# Patient Record
Sex: FEMALE | Race: WHITE | Employment: OTHER | URBAN - METROPOLITAN AREA
[De-identification: names, ages, dates, MRNs, and addresses within clinical notes are randomized per-mention and may not be internally consistent; named-entity substitution may affect disease eponyms.]

---

## 2018-10-16 ENCOUNTER — APPOINTMENT (OUTPATIENT)
Dept: CT IMAGING | Age: 83
DRG: 690 | End: 2018-10-16
Attending: EMERGENCY MEDICINE
Payer: MEDICARE

## 2018-10-16 ENCOUNTER — APPOINTMENT (OUTPATIENT)
Dept: GENERAL RADIOLOGY | Age: 83
DRG: 690 | End: 2018-10-16
Attending: EMERGENCY MEDICINE
Payer: MEDICARE

## 2018-10-16 ENCOUNTER — HOSPITAL ENCOUNTER (INPATIENT)
Age: 83
LOS: 3 days | Discharge: NURSING, REHAB OR LONG-TERM CARE | DRG: 690 | End: 2018-10-20
Attending: EMERGENCY MEDICINE | Admitting: INTERNAL MEDICINE
Payer: MEDICARE

## 2018-10-16 DIAGNOSIS — G93.40 ACUTE ENCEPHALOPATHY: Primary | ICD-10-CM

## 2018-10-16 DIAGNOSIS — N39.0 URINARY TRACT INFECTION WITHOUT HEMATURIA, SITE UNSPECIFIED: ICD-10-CM

## 2018-10-16 LAB
ALBUMIN SERPL-MCNC: 3.3 G/DL (ref 3.5–5)
ALBUMIN/GLOB SERPL: 0.9 {RATIO} (ref 1.1–2.2)
ALP SERPL-CCNC: 63 U/L (ref 45–117)
ALT SERPL-CCNC: 14 U/L (ref 12–78)
ANION GAP SERPL CALC-SCNC: 6 MMOL/L (ref 5–15)
APPEARANCE UR: ABNORMAL
AST SERPL-CCNC: 17 U/L (ref 15–37)
BACTERIA URNS QL MICRO: ABNORMAL /HPF
BASOPHILS # BLD: 0 K/UL (ref 0–0.1)
BASOPHILS NFR BLD: 0 % (ref 0–1)
BILIRUB SERPL-MCNC: 0.9 MG/DL (ref 0.2–1)
BILIRUB UR QL CFM: NEGATIVE
BUN SERPL-MCNC: 26 MG/DL (ref 6–20)
BUN/CREAT SERPL: 23 (ref 12–20)
CALCIUM SERPL-MCNC: 8.9 MG/DL (ref 8.5–10.1)
CHLORIDE SERPL-SCNC: 107 MMOL/L (ref 97–108)
CK MB CFR SERPL CALC: NORMAL % (ref 0–2.5)
CK MB SERPL-MCNC: <1 NG/ML (ref 5–25)
CK SERPL-CCNC: 40 U/L (ref 26–192)
CO2 SERPL-SCNC: 28 MMOL/L (ref 21–32)
COLOR UR: ABNORMAL
CREAT SERPL-MCNC: 1.15 MG/DL (ref 0.55–1.02)
DIFFERENTIAL METHOD BLD: NORMAL
EOSINOPHIL # BLD: 0.1 K/UL (ref 0–0.4)
EOSINOPHIL NFR BLD: 2 % (ref 0–7)
EPITH CASTS URNS QL MICRO: ABNORMAL /LPF
ERYTHROCYTE [DISTWIDTH] IN BLOOD BY AUTOMATED COUNT: 13.3 % (ref 11.5–14.5)
GLOBULIN SER CALC-MCNC: 3.7 G/DL (ref 2–4)
GLUCOSE SERPL-MCNC: 95 MG/DL (ref 65–100)
GLUCOSE UR STRIP.AUTO-MCNC: NEGATIVE MG/DL
HCT VFR BLD AUTO: 40.1 % (ref 35–47)
HGB BLD-MCNC: 13.5 G/DL (ref 11.5–16)
HGB UR QL STRIP: NEGATIVE
IMM GRANULOCYTES # BLD: 0 K/UL (ref 0–0.04)
IMM GRANULOCYTES NFR BLD AUTO: 0 % (ref 0–0.5)
KETONES UR QL STRIP.AUTO: ABNORMAL MG/DL
LACTATE SERPL-SCNC: 1.1 MMOL/L (ref 0.4–2)
LEUKOCYTE ESTERASE UR QL STRIP.AUTO: ABNORMAL
LYMPHOCYTES # BLD: 1.6 K/UL (ref 0.8–3.5)
LYMPHOCYTES NFR BLD: 28 % (ref 12–49)
MAGNESIUM SERPL-MCNC: 2.1 MG/DL (ref 1.6–2.4)
MCH RBC QN AUTO: 31 PG (ref 26–34)
MCHC RBC AUTO-ENTMCNC: 33.7 G/DL (ref 30–36.5)
MCV RBC AUTO: 92.2 FL (ref 80–99)
MONOCYTES # BLD: 0.6 K/UL (ref 0–1)
MONOCYTES NFR BLD: 10 % (ref 5–13)
NEUTS SEG # BLD: 3.5 K/UL (ref 1.8–8)
NEUTS SEG NFR BLD: 60 % (ref 32–75)
NITRITE UR QL STRIP.AUTO: NEGATIVE
NRBC # BLD: 0 K/UL (ref 0–0.01)
NRBC BLD-RTO: 0 PER 100 WBC
PH UR STRIP: 5.5 [PH] (ref 5–8)
PLATELET # BLD AUTO: 150 K/UL (ref 150–400)
PMV BLD AUTO: 9.3 FL (ref 8.9–12.9)
POTASSIUM SERPL-SCNC: 3.8 MMOL/L (ref 3.5–5.1)
PROT SERPL-MCNC: 7 G/DL (ref 6.4–8.2)
PROT UR STRIP-MCNC: ABNORMAL MG/DL
RBC # BLD AUTO: 4.35 M/UL (ref 3.8–5.2)
RBC #/AREA URNS HPF: ABNORMAL /HPF (ref 0–5)
SODIUM SERPL-SCNC: 141 MMOL/L (ref 136–145)
SP GR UR REFRACTOMETRY: 1.03 (ref 1–1.03)
TROPONIN I SERPL-MCNC: <0.05 NG/ML
UA: UC IF INDICATED,UAUC: ABNORMAL
UROBILINOGEN UR QL STRIP.AUTO: 1 EU/DL (ref 0.2–1)
WBC # BLD AUTO: 5.8 K/UL (ref 3.6–11)
WBC URNS QL MICRO: ABNORMAL /HPF (ref 0–4)

## 2018-10-16 PROCEDURE — 83605 ASSAY OF LACTIC ACID: CPT | Performed by: EMERGENCY MEDICINE

## 2018-10-16 PROCEDURE — 71045 X-RAY EXAM CHEST 1 VIEW: CPT

## 2018-10-16 PROCEDURE — 74011250636 HC RX REV CODE- 250/636: Performed by: INTERNAL MEDICINE

## 2018-10-16 PROCEDURE — 36415 COLL VENOUS BLD VENIPUNCTURE: CPT | Performed by: EMERGENCY MEDICINE

## 2018-10-16 PROCEDURE — 84484 ASSAY OF TROPONIN QUANT: CPT | Performed by: EMERGENCY MEDICINE

## 2018-10-16 PROCEDURE — 93005 ELECTROCARDIOGRAM TRACING: CPT

## 2018-10-16 PROCEDURE — 83735 ASSAY OF MAGNESIUM: CPT | Performed by: EMERGENCY MEDICINE

## 2018-10-16 PROCEDURE — 87077 CULTURE AEROBIC IDENTIFY: CPT | Performed by: EMERGENCY MEDICINE

## 2018-10-16 PROCEDURE — 80053 COMPREHEN METABOLIC PANEL: CPT | Performed by: EMERGENCY MEDICINE

## 2018-10-16 PROCEDURE — 74011250636 HC RX REV CODE- 250/636: Performed by: EMERGENCY MEDICINE

## 2018-10-16 PROCEDURE — 96365 THER/PROPH/DIAG IV INF INIT: CPT

## 2018-10-16 PROCEDURE — 82550 ASSAY OF CK (CPK): CPT | Performed by: EMERGENCY MEDICINE

## 2018-10-16 PROCEDURE — 96361 HYDRATE IV INFUSION ADD-ON: CPT

## 2018-10-16 PROCEDURE — 99284 EMERGENCY DEPT VISIT MOD MDM: CPT

## 2018-10-16 PROCEDURE — 81001 URINALYSIS AUTO W/SCOPE: CPT | Performed by: EMERGENCY MEDICINE

## 2018-10-16 PROCEDURE — 74011250637 HC RX REV CODE- 250/637: Performed by: INTERNAL MEDICINE

## 2018-10-16 PROCEDURE — 87086 URINE CULTURE/COLONY COUNT: CPT | Performed by: EMERGENCY MEDICINE

## 2018-10-16 PROCEDURE — 85025 COMPLETE CBC W/AUTO DIFF WBC: CPT | Performed by: EMERGENCY MEDICINE

## 2018-10-16 PROCEDURE — 74011000258 HC RX REV CODE- 258: Performed by: EMERGENCY MEDICINE

## 2018-10-16 PROCEDURE — 70450 CT HEAD/BRAIN W/O DYE: CPT

## 2018-10-16 RX ORDER — ACETAMINOPHEN 325 MG/1
650 TABLET ORAL
Status: DISCONTINUED | OUTPATIENT
Start: 2018-10-16 | End: 2018-10-20 | Stop reason: HOSPADM

## 2018-10-16 RX ORDER — SODIUM CHLORIDE 9 MG/ML
75 INJECTION, SOLUTION INTRAVENOUS CONTINUOUS
Status: DISCONTINUED | OUTPATIENT
Start: 2018-10-16 | End: 2018-10-18

## 2018-10-16 RX ORDER — ONDANSETRON 2 MG/ML
4 INJECTION INTRAMUSCULAR; INTRAVENOUS
Status: DISCONTINUED | OUTPATIENT
Start: 2018-10-16 | End: 2018-10-20 | Stop reason: HOSPADM

## 2018-10-16 RX ORDER — FLUOXETINE HYDROCHLORIDE 20 MG/1
20 CAPSULE ORAL DAILY
COMMUNITY

## 2018-10-16 RX ORDER — BUSPIRONE HYDROCHLORIDE 5 MG/1
5 TABLET ORAL
COMMUNITY

## 2018-10-16 RX ORDER — COLCHICINE 0.6 MG/1
1.2 TABLET ORAL ONCE
Status: COMPLETED | OUTPATIENT
Start: 2018-10-16 | End: 2018-10-16

## 2018-10-16 RX ORDER — LANOLIN ALCOHOL/MO/W.PET/CERES
3 CREAM (GRAM) TOPICAL
Status: DISCONTINUED | OUTPATIENT
Start: 2018-10-16 | End: 2018-10-20 | Stop reason: HOSPADM

## 2018-10-16 RX ORDER — ENOXAPARIN SODIUM 100 MG/ML
30 INJECTION SUBCUTANEOUS EVERY 24 HOURS
Status: DISCONTINUED | OUTPATIENT
Start: 2018-10-17 | End: 2018-10-20 | Stop reason: HOSPADM

## 2018-10-16 RX ADMIN — CEFTRIAXONE 1 G: 1 INJECTION, POWDER, FOR SOLUTION INTRAMUSCULAR; INTRAVENOUS at 22:06

## 2018-10-16 RX ADMIN — COLCHICINE 1.2 MG: 0.6 TABLET, FILM COATED ORAL at 23:58

## 2018-10-16 RX ADMIN — SODIUM CHLORIDE 75 ML/HR: 900 INJECTION, SOLUTION INTRAVENOUS at 23:58

## 2018-10-16 RX ADMIN — SODIUM CHLORIDE 1000 ML: 900 INJECTION, SOLUTION INTRAVENOUS at 20:37

## 2018-10-16 NOTE — ED PROVIDER NOTES
EMERGENCY DEPARTMENT HISTORY AND PHYSICAL EXAM 
 
 
Date: 10/16/2018 Patient Name: Julien Urrutia History of Presenting Illness No chief complaint on file. History Provided By: Patient's neice HPI: Julien Urrutia, 80 y.o. female with PMHx significant for HTN, thyroid disease, and hypercholesterolemia, presents via EMS to the ED for further evaluation of AMS and decreased activity s/p 2 falls last week. Pt's relative reports associated sx of a decreased appetite and left foot swelling and erythema noticed this morning. Family expresses the pt sustained 2 falls last week one resulting in head trauma noting the pt has been extremely lethargic since. She states the pt lives at home with her brother and her care is provided by caregivers who live with the pt. Family discloses the pt is normally ambulatory with a walker, oriented to self, and has urge incontinence however, over the last week the pt has been asleep, in bed, and totally incontinent. This morning the caregivers noticed the pt's left foot was swollen and erythematous which exacerbated as the day progressed leading family to call EMS. History is limited due to change in mental status. There are no other complaints, changes, or physical findings at this time. PCP: Dimitri Shi MD 
 
Current Outpatient Prescriptions Medication Sig Dispense Refill  CHOLECALCIFEROL, VITAMIN D3, (VITAMIN D3 PO) Take  by mouth daily.  VITAMIN E ACETATE (VITAMIN E PO) Take  by mouth daily. Past History Past Medical History: 
Past Medical History:  
Diagnosis Date  Endocrine disease  Goiter  High cholesterol  Hypertension Past Surgical History: 
Past Surgical History:  
Procedure Laterality Date  BREAST SURGERY PROCEDURE UNLISTED  HX APPENDECTOMY  HX GI    
 HX GYN    
 hysterectomy  HX ORTHOPAEDIC    
 back fusion  HX THYROIDECTOMY partial - 50 years ago  HX TONSILLECTOMY Family History: 
Family History Problem Relation Age of Onset  Heart Disease Brother  Stroke Brother Social History: 
Social History Substance Use Topics  Smoking status: Never Smoker  Smokeless tobacco: Not on file  Alcohol use No  
 
 
Allergies: Allergies Allergen Reactions  Pcn [Penicillins] Itching Review of Systems Review of Systems Unable to perform ROS: Mental status change Physical Exam  
Physical Exam  
Constitutional: She appears well-developed and well-nourished. No distress. HENT:  
Head: Normocephalic and atraumatic. Mouth/Throat: Oropharynx is clear and moist.  
Eyes: Conjunctivae and EOM are normal.  
Pupils are 4mm and sluggish Neck: Neck supple. No JVD present. No tracheal deviation present. Cardiovascular: Normal rate, regular rhythm and intact distal pulses. Exam reveals no gallop and no friction rub. No murmur heard. Pulses: 
     Dorsalis pedis pulses are 2+ on the right side, and 2+ on the left side. Pulmonary/Chest: Effort normal and breath sounds normal. No stridor. No respiratory distress. She has no wheezes. 2L nasal cannula Abdominal: Soft. Bowel sounds are normal. She exhibits no distension and no mass. There is no tenderness. There is no rebound and no guarding. Musculoskeletal: Normal range of motion. She exhibits no edema. No deformity Swelling of the left foot proximal to the left ankle with erythema and warmth to palpation from the left 1st metatarsal joint to the medial foot 3+ pitting pedal edema Neurological: She has normal strength. No focal deficits Opens eyes to verbal and physical stimuli Pt is not following commands Skin: Skin is warm, dry and intact. No rash noted. Nursing note and vitals reviewed. Diagnostic Study Results Labs - Recent Results (from the past 12 hour(s)) METABOLIC PANEL, COMPREHENSIVE Collection Time: 10/16/18  8:17 PM  
Result Value Ref Range Sodium 141 136 - 145 mmol/L Potassium 3.8 3.5 - 5.1 mmol/L Chloride 107 97 - 108 mmol/L  
 CO2 28 21 - 32 mmol/L Anion gap 6 5 - 15 mmol/L Glucose 95 65 - 100 mg/dL BUN 26 (H) 6 - 20 MG/DL Creatinine 1.15 (H) 0.55 - 1.02 MG/DL  
 BUN/Creatinine ratio 23 (H) 12 - 20 GFR est AA 54 (L) >60 ml/min/1.73m2 GFR est non-AA 45 (L) >60 ml/min/1.73m2 Calcium 8.9 8.5 - 10.1 MG/DL Bilirubin, total 0.9 0.2 - 1.0 MG/DL  
 ALT (SGPT) 14 12 - 78 U/L  
 AST (SGOT) 17 15 - 37 U/L Alk. phosphatase 63 45 - 117 U/L Protein, total 7.0 6.4 - 8.2 g/dL Albumin 3.3 (L) 3.5 - 5.0 g/dL Globulin 3.7 2.0 - 4.0 g/dL A-G Ratio 0.9 (L) 1.1 - 2.2 CK W/ CKMB & INDEX Collection Time: 10/16/18  8:17 PM  
Result Value Ref Range CK 40 26 - 192 U/L  
 CK - MB <1.0 <3.6 NG/ML  
 CK-MB Index Cannot be calculated 0 - 2.5    
CBC WITH AUTOMATED DIFF Collection Time: 10/16/18  8:17 PM  
Result Value Ref Range WBC 5.8 3.6 - 11.0 K/uL  
 RBC 4.35 3.80 - 5.20 M/uL  
 HGB 13.5 11.5 - 16.0 g/dL HCT 40.1 35.0 - 47.0 % MCV 92.2 80.0 - 99.0 FL  
 MCH 31.0 26.0 - 34.0 PG  
 MCHC 33.7 30.0 - 36.5 g/dL  
 RDW 13.3 11.5 - 14.5 % PLATELET 329 660 - 039 K/uL MPV 9.3 8.9 - 12.9 FL  
 NRBC 0.0 0  WBC ABSOLUTE NRBC 0.00 0.00 - 0.01 K/uL NEUTROPHILS 60 32 - 75 % LYMPHOCYTES 28 12 - 49 % MONOCYTES 10 5 - 13 % EOSINOPHILS 2 0 - 7 % BASOPHILS 0 0 - 1 % IMMATURE GRANULOCYTES 0 0.0 - 0.5 % ABS. NEUTROPHILS 3.5 1.8 - 8.0 K/UL  
 ABS. LYMPHOCYTES 1.6 0.8 - 3.5 K/UL  
 ABS. MONOCYTES 0.6 0.0 - 1.0 K/UL  
 ABS. EOSINOPHILS 0.1 0.0 - 0.4 K/UL  
 ABS. BASOPHILS 0.0 0.0 - 0.1 K/UL  
 ABS. IMM. GRANS. 0.0 0.00 - 0.04 K/UL  
 DF AUTOMATED    
TROPONIN I Collection Time: 10/16/18  8:17 PM  
Result Value Ref Range Troponin-I, Qt. <0.05 <0.05 ng/mL LACTIC ACID Collection Time: 10/16/18  8:17 PM  
Result Value Ref Range  Lactic acid 1.1 0.4 - 2.0 MMOL/L  
MAGNESIUM  
 Collection Time: 10/16/18  8:17 PM  
Result Value Ref Range Magnesium 2.1 1.6 - 2.4 mg/dL URINALYSIS W/ REFLEX CULTURE Collection Time: 10/16/18  9:09 PM  
Result Value Ref Range Color DARK YELLOW Appearance CLOUDY (A) CLEAR Specific gravity 1.030 1.003 - 1.030    
 pH (UA) 5.5 5.0 - 8.0 Protein TRACE (A) NEG mg/dL Glucose NEGATIVE  NEG mg/dL Ketone TRACE (A) NEG mg/dL Blood NEGATIVE  NEG Urobilinogen 1.0 0.2 - 1.0 EU/dL Nitrites NEGATIVE  NEG Leukocyte Esterase SMALL (A) NEG    
 WBC 10-20 0 - 4 /hpf  
 RBC 0-5 0 - 5 /hpf Epithelial cells FEW FEW /lpf Bacteria 4+ (A) NEG /hpf  
 UA:UC IF INDICATED URINE CULTURE ORDERED (A) CNI    
BILIRUBIN, CONFIRM Collection Time: 10/16/18  9:09 PM  
Result Value Ref Range Bilirubin UA, confirm NEGATIVE  NEG Radiologic Studies -  
CT Results  (Last 48 hours) 10/16/18 2000  CT HEAD WO CONT Final result Impression:  IMPRESSION:  No acute findings. White matter hypodensity consistent with chronic  
small vessel ischemic change. Narrative:  EXAMINATION:  CT HEAD WO CONT  
   
CLINICAL INFORMATION:  Confusion/delirium, altered LOC, unexplained COMPARISON:  3/9/2013 TECHNIQUE: Routine axial head CT was performed. IV contrast was not  
administered. Sagittal and coronal reconstructions were generated. CT dose reduction was achieved through use of a standardized protocol tailored  
for this examination and automatic exposure control for dose modulation. FINDINGS:  
No acute infarct, hemorrhage or mass. VENTRICULAR SYSTEM:  Normal for age. BASAL CISTERNS:  Patent. BRAIN PARENCHYMA:  Hypodensity of the cerebral white matter, likely due to  
intracranial small vessel disease. Progression since 2013. MIDLINE SHIFT:  None. CALVARIUM/ SKULL BASE: Intact. Bilateral TMJ arthropathy. PARANASAL SINUSES AND MASTOID AIR CELLS: Clear. VISUALIZED ORBITS: No significant abnormalities. SELLA: No enlargement. CXR Results  (Last 48 hours) 10/16/18 2010  XR CHEST PORT Final result Impression:  IMPRESSION:   
   
No acute process. Narrative:  EXAM:  XR CHEST PORT INDICATION:  Chest Pain COMPARISON:  6/27/2016 FINDINGS:  
   
A portable AP radiograph of the chest was obtained at 20:03 hours. The lungs are  
clear. There is mild cardiomegaly but no vascular congestion or pleural fluid. The bones and soft tissues are unremarkable. There has been no change since the  
prior study. There is tracheal displacement to the left which is likely due to  
thyroid enlargement and which is unchanged since 2013. Medical Decision Making I am the first provider for this patient. I reviewed the vital signs, available nursing notes, past medical history, past surgical history, family history and social history. Vital Signs-Reviewed the patient's vital signs. Patient Vitals for the past 12 hrs: 
 Temp Pulse Resp BP SpO2  
10/16/18 2100 - - - 148/89 (!) 89 % 10/16/18 2008 - - - 138/79 -  
10/16/18 1912 98.2 °F (36.8 °C) 64 16 123/68 99 % Pulse Oximetry Analysis - 99% on 2L nasal cannula Cardiac Monitor:  
Rate: 64 bpm 
Rhythm: Normal Sinus Rhythm EKG interpretation: (Preliminary) 1908 Rhythm: NSR with RBBB; and regular . Rate (approx.): 68; Axis: left axis deviation; DC interval: normal; QRS interval: widened; ST/T wave: non-specific changes; Other findings: non-ischemic. Records Reviewed: Nursing Notes, Old Medical Records, Previous electrocardiograms, Ambulance Run Sheet, Previous Radiology Studies and Previous Laboratory Studies Provider Notes (Medical Decision Making): DDx: uti, pneumonia, dehydration, karime, electrolyte abnormality, worsening dementia, ICH 
 
ED Course:  
Initial assessment performed.  The patients presenting problems have been discussed, and they are in agreement with the care plan formulated and outlined with them. I have encouraged them to ask questions as they arise throughout their visit. Progress Notes: 
 
CONSULT NOTE:  
10:00 PM 
Kimberly Castillo DO spoke with Dr. Yolanda Nguyen, Specialty: Hospitalist 
Discussed pt's hx, disposition, and available diagnostic and imaging results. Reviewed care plans. Consultant will evaluate pt for admission. Written by Lexy Parry ED Scribe, as dictated by Kimberly Castillo DO. Critical Care Time: 0 minutes Disposition: 
Admit Note: 
10:00 PM 
Patient is being admitted to the hospital by Dr. Yolanda Nguyen. The results of their tests and reasons for their admission have been discussed with the patient and/or available family. They convey their agreement and understanding for the need to be admitted and for their admission diagnosis. Written by Sangeetha Albrecht ED Scribe, as dictated by Kimberly Castillo DO. PLAN: 
1. Admission Diagnosis Clinical Impression: 1. Acute encephalopathy 2. Urinary tract infection without hematuria, site unspecified Attestations: 
 
Attestation: This note is prepared by Katie Sanches. Ambrocio, acting as Scribe for Kimberly Castillo DO. Kimberly Castillo DO: The scribe's documentation has been prepared under my direction and personally reviewed by me in its entirety. I confirm that the note above accurately reflects all work, treatment, procedures, and medical decision making performed by me.

## 2018-10-17 ENCOUNTER — APPOINTMENT (OUTPATIENT)
Dept: GENERAL RADIOLOGY | Age: 83
DRG: 690 | End: 2018-10-17
Attending: INTERNAL MEDICINE
Payer: MEDICARE

## 2018-10-17 ENCOUNTER — APPOINTMENT (OUTPATIENT)
Dept: ULTRASOUND IMAGING | Age: 83
DRG: 690 | End: 2018-10-17
Attending: EMERGENCY MEDICINE
Payer: MEDICARE

## 2018-10-17 LAB
ATRIAL RATE: 68 BPM
CALCULATED P AXIS, ECG09: 67 DEGREES
CALCULATED R AXIS, ECG10: -84 DEGREES
CALCULATED T AXIS, ECG11: 3 DEGREES
DIAGNOSIS, 93000: NORMAL
P-R INTERVAL, ECG05: 206 MS
Q-T INTERVAL, ECG07: 478 MS
QRS DURATION, ECG06: 144 MS
QTC CALCULATION (BEZET), ECG08: 508 MS
VENTRICULAR RATE, ECG03: 68 BPM

## 2018-10-17 PROCEDURE — 77010033678 HC OXYGEN DAILY

## 2018-10-17 PROCEDURE — 73620 X-RAY EXAM OF FOOT: CPT

## 2018-10-17 PROCEDURE — G8988 SELF CARE GOAL STATUS: HCPCS | Performed by: OCCUPATIONAL THERAPIST

## 2018-10-17 PROCEDURE — 97161 PT EVAL LOW COMPLEX 20 MIN: CPT | Performed by: PHYSICAL THERAPIST

## 2018-10-17 PROCEDURE — 97530 THERAPEUTIC ACTIVITIES: CPT | Performed by: OCCUPATIONAL THERAPIST

## 2018-10-17 PROCEDURE — 93971 EXTREMITY STUDY: CPT

## 2018-10-17 PROCEDURE — 94760 N-INVAS EAR/PLS OXIMETRY 1: CPT

## 2018-10-17 PROCEDURE — 74011250637 HC RX REV CODE- 250/637: Performed by: INTERNAL MEDICINE

## 2018-10-17 PROCEDURE — G8987 SELF CARE CURRENT STATUS: HCPCS | Performed by: OCCUPATIONAL THERAPIST

## 2018-10-17 PROCEDURE — 74011000258 HC RX REV CODE- 258: Performed by: INTERNAL MEDICINE

## 2018-10-17 PROCEDURE — 97530 THERAPEUTIC ACTIVITIES: CPT | Performed by: PHYSICAL THERAPIST

## 2018-10-17 PROCEDURE — 65270000029 HC RM PRIVATE

## 2018-10-17 PROCEDURE — G8989 SELF CARE D/C STATUS: HCPCS | Performed by: OCCUPATIONAL THERAPIST

## 2018-10-17 PROCEDURE — 97167 OT EVAL HIGH COMPLEX 60 MIN: CPT | Performed by: OCCUPATIONAL THERAPIST

## 2018-10-17 PROCEDURE — 74011250636 HC RX REV CODE- 250/636: Performed by: INTERNAL MEDICINE

## 2018-10-17 RX ORDER — BUSPIRONE HYDROCHLORIDE 10 MG/1
5 TABLET ORAL 2 TIMES DAILY
Status: DISCONTINUED | OUTPATIENT
Start: 2018-10-17 | End: 2018-10-20 | Stop reason: HOSPADM

## 2018-10-17 RX ORDER — CYANOCOBALAMIN (VITAMIN B-12) 500 MCG
400 TABLET ORAL DAILY
Status: DISCONTINUED | OUTPATIENT
Start: 2018-10-17 | End: 2018-10-20 | Stop reason: HOSPADM

## 2018-10-17 RX ORDER — FLUOXETINE HYDROCHLORIDE 20 MG/1
20 CAPSULE ORAL DAILY
Status: DISCONTINUED | OUTPATIENT
Start: 2018-10-17 | End: 2018-10-20 | Stop reason: HOSPADM

## 2018-10-17 RX ADMIN — FLUOXETINE HYDROCHLORIDE 20 MG: 20 CAPSULE ORAL at 09:31

## 2018-10-17 RX ADMIN — ACETAMINOPHEN 650 MG: 325 TABLET ORAL at 05:14

## 2018-10-17 RX ADMIN — BUSPIRONE HYDROCHLORIDE 5 MG: 10 TABLET ORAL at 18:25

## 2018-10-17 RX ADMIN — ENOXAPARIN SODIUM 30 MG: 30 INJECTION, SOLUTION INTRAVENOUS; SUBCUTANEOUS at 09:31

## 2018-10-17 RX ADMIN — BUSPIRONE HYDROCHLORIDE 5 MG: 10 TABLET ORAL at 09:31

## 2018-10-17 RX ADMIN — CEFTRIAXONE 1 G: 1 INJECTION, POWDER, FOR SOLUTION INTRAMUSCULAR; INTRAVENOUS at 18:25

## 2018-10-17 NOTE — PROGRESS NOTES
Reason for Admission:   Acute encephalopathy; UTI (urniary tract infection) RRAT Score:    8 LOW Plan for utilizing home health:   Per recommendation Likelihood of Readmission:   High per pt acuity and co morbidities Transition of Care Plan:                  Pt is a 80year old,  female, admitted with Acute encephalopathy; UTI (urniary tract infection). Pt was alert but not oriented. CM contacted pt niece who is the POA and point of contact. Pt niece states pt is living with her brother (elina Arrington) in a one level home with a ramp in the back. Pt has a walker and bedside commode. Pt does not drive and has had HH in the past but the name is unknown. Pt has not been to a SNF in the past. Pt's preferred pharmacy is the [facility] in [facility]. Pt niece states they would like pt to go to SNF and eventually transfer to LTC. Pt niece and family has chosen [facility] for SNF and would like PT/OT to attempt working with pt. At time of discharge pt will need [facility]. CM will continue to follow pt for discharge planning. Care Management Interventions PCP Verified by CM: Yes Mode of Transport at Discharge: BLS Transition of Care Consult (CM Consult): Discharge Planning, SNF MyChart Signup: No 
Discharge Durable Medical Equipment: No 
Health Maintenance Reviewed: Yes Physical Therapy Consult: Yes Occupational Therapy Consult: Yes Speech Therapy Consult: No 
Current Support Network: Lives with Caregiver, [place] Confirm Follow Up Transport: Family Plan discussed with Pt/Family/Caregiver: Yes Freedom of Choice Offered: Yes Discharge Location Discharge Placement: Other:(TBD) AYANA Jade, ISABEL [facility] [phone]

## 2018-10-17 NOTE — PROGRESS NOTES
Hospitalist Progress Note NAME: Gabriela Rosario :  1931 MRN:  [id number] Assessment / Plan: 
Acute encephalopathy Generalized weakness, s/p falls 
recent fall but exam nonfocal and CT head nothing acute. AMS/ weakness likely metabolic and multifactorial from UTI, dehydration. UTI 
continue Rocephin , will wait for cultures to come back. Dehydration Continue the IVF hydration. Acute gout, left foot Pain and swelling better. Given a dose of  colchicine 1.2mg x 1.  , checking Xray Waiting uric acid level , may need to be placed on gout medications. PT OT eval and treat Depression / Anxiety 
continue prozac and buspar Hx Partial Thyroidectomy TSH is low , will wait free t4 and t3 result. Body mass index is 27.28 kg/m².: 25.0 - 29.9 Overweight Code status: DNR Prophylaxis: Lovenox Recommended Disposition: tbd Subjective: Chief Complaint / Reason for Physician Visit \"mild confusion still going on \". Discussed with RN events overnight. Review of Systems: 
Symptom Y/N Comments  Symptom Y/N Comments Fever/Chills    Chest Pain Poor Appetite    Edema Cough    Abdominal Pain Sputum    Joint Pain SOB/JAIN    Pruritis/Rash Nausea/vomit    Tolerating PT/OT Diarrhea    Tolerating Diet Constipation    Other Could NOT obtain due to: AMS Objective: VITALS:  
Last 24hrs VS reviewed since prior progress note. Most recent are: 
Patient Vitals for the past 24 hrs: 
 Temp Pulse Resp BP SpO2  
10/17/18 1219 98.1 °F (36.7 °C) 72 18 126/66 95 % 10/17/18 0928 98.1 °F (36.7 °C) 85 16 115/82 94 % 10/17/18 0430 98.5 °F (36.9 °C) 91 18 (!) 158/91 90 % 10/17/18 0030 98.8 °F (37.1 °C) 79 16 (!) 158/97 96 % 10/17/18 0001 98.4 °F (36.9 °C) 66 18 147/84 95 % 10/16/18 2300    108/65 94 % 10/16/18 2200    137/80 94 % 10/16/18 2100    148/89 (!) 89 % 10/16/18 2008    138/79  10/16/18 1912 98.2 °F (36.8 °C) 64 16 123/68 99 % No intake or output data in the 24 hours ending 10/17/18 1709 PHYSICAL EXAM: 
General: WD, WN. Alert, cooperative, no acute distress   
EENT:  EOMI. Anicteric sclerae. MMM Resp:  CTA bilaterally, no wheezing or rales. No accessory muscle use CV:  Regular  rhythm,  No edema GI:  Soft, Non distended, Non tender.  +Bowel sounds Neurologic:  Mild confusion noted Left foot swollen specially close to the base of 1 toe and ankle area. Reviewed most current lab test results and cultures  YES Reviewed most current radiology test results   YES Review and summation of old records today    NO Reviewed patient's current orders and MAR    YES 
PMH/ reviewed - no change compared to H&P 
________________________________________________________________________ Care Plan discussed with: 
  Comments Patient y Family RN y   
Care Manager Consultant Multidiciplinary team rounds were held today with , nursing, pharmacist and clinical coordinator. Patient's plan of care was discussed; medications were reviewed and discharge planning was addressed. ________________________________________________________________________ Total NON critical care TIME:  30    Minutes Total CRITICAL CARE TIME Spent:   Minutes non procedure based Comments >50% of visit spent in counseling and coordination of care    
________________________________________________________________________ Singh Castelan MD  
 
Procedures: see electronic medical records for all procedures/Xrays and details which were not copied into this note but were reviewed prior to creation of Plan. LABS: 
I reviewed today's most current labs and imaging studies. Pertinent labs include: 
Recent Labs 10/16/18 
2017 WBC 5.8 HGB 13.5 HCT 40.1  Recent Labs 10/16/18 
2017   
K 3.8  CO2 28  
GLU 95 BUN 26* CREA 1.15* CA 8.9 MG 2.1 ALB 3.3* TBILI 0.9 SGOT 17 ALT 14 Signed:  Franci Ortez MD

## 2018-10-17 NOTE — H&P
Hospitalist Admission NoteNAME: Gabriela Rosario :  1931 MRN:  [id number] Date/Time:  10/16/2018 11:29 PM 
 
Patient PCP: Alejandra Ceja MD 
_____________________________________________________________________ Given the patient's current clinical presentation, I have a high level of concern for decompensation if discharged from the emergency department. Complex decision making was performed, which includes reviewing the patient's available past medical records, laboratory results, and x-ray films. My assessment of this patient's clinical condition and my plan of care is as follows. Assessment / Plan: 
 
Acute encephalopathy Generalized weakness, s/p falls 
-recent fall but exam nonfocal and CT head nothing acute. -AMS/ weakness likely metabolic and multifactorial from UTI, dehydration. UTI 
-continue Rocephin started in ER Dehydration 
-pre renal shift in labs. Start IVF hydration Acute gout, left foot 
-fell a week ago but her left foot pain and swelling started yesterday and worse today. -dehydration and recent immobility predisposes to gout. Will check UA in AM and give a dose of colchicine 1.2mg x 1.    
-PT OT eval and treat Depression / Anxiety 
-continue prozac and buspar Hx Partial Thyroidectomy 
-No longer on replacement therapy. Will check TSH Code Status:  DNR Surrogate Decision Maker:   Niece (Roshni Carr) mPOA. [phone] cell DVT Prophylaxis:   lovenox GI Prophylaxis: not indicated Baseline:  Lives with brother. Single. No children. Uses walker Subjective: CHIEF COMPLAINT:   AMS HISTORY OF PRESENT ILLNESS:    
Mihir Strong is a 80 y.o.  female with a history of dementia, thyroid disease and anxiety who presents to the ER via EMS because of AMS. Patient lives with her brother and has 24/7 caregivers. She typically is alert, talkative and ambulates with a walker.   Lately she has been appearing sleepy, less verbal, appearing weaker with poor appetite. She reportedly fell 2-3 weeks ago and may have hit her head. Yesterday, she experienced left foot pain and swelling which worsened to the point that she could not bear weight today. ER evaluation remarkable for UTI and dehydration. We were asked to admit for work up and evaluation of the above problems. Past Medical History:  
Diagnosis Date  Dementia  Endocrine disease  Goiter  High cholesterol  Hypertension Past Surgical History:  
Procedure Laterality Date  BREAST SURGERY PROCEDURE UNLISTED  HX APPENDECTOMY  HX GI    
 HX GYN    
 hysterectomy  HX ORTHOPAEDIC    
 back fusion  HX THYROIDECTOMY partial - 50 years ago  HX TONSILLECTOMY Social History Substance Use Topics  Smoking status: Never Smoker  Smokeless tobacco: Never Used  Alcohol use No  
  
 
Family History Problem Relation Age of Onset  Heart Disease Brother  Stroke Brother Allergies Allergen Reactions  Pcn [Penicillins] Itching Prior to Admission medications Medication Sig Start Date End Date Taking? Authorizing Provider FLUoxetine (PROZAC) 20 mg capsule Take 20 mg by mouth daily. Indications: 1/2 tab   Yes Lara Mccarthy MD  
busPIRone (BUSPAR) 5 mg tablet Take 5 mg by mouth three (3) times daily (with meals). Yes Lara Mccarthy MD  
CHOLECALCIFEROL, VITAMIN D3, (VITAMIN D3 PO) Take  by mouth daily. Yes Lara Mccarthy MD  
VITAMIN E ACETATE (VITAMIN E PO) Take  by mouth daily. Lara Mccarthy MD  
 
 
REVIEW OF SYSTEMS:    
I am not able to complete the review of systems because: The patient is intubated and sedated  
x The patient has altered mental status due to his acute medical problems The patient has baseline aphasia from prior stroke(s)  
x The patient has baseline dementia and is not reliable historian The patient is in acute medical distress and unable to provide information Objective: VITALS:   
Visit Vitals  /89  Pulse 64  Temp 98.2 °F (36.8 °C)  Resp 16  
 Ht 5' 9\" (1.753 m)  Wt 83.8 kg (184 lb 11.9 oz)  SpO2 (!) 89%  BMI 27.28 kg/m2 PHYSICAL EXAM: 
 
 
_______________________________________________________________________ Care Plan discussed with: 
  Comments Patient x Family  x  Brady  
RN Care Manager Consultant:     
_______________________________________________________________________ Expected  Disposition:  
Home with Family HH/PT/OT/RN x  
SNF/LTC x  
[facility]   
________________________________________________________________________ TOTAL TIME:  50  Minutes Critical Care Provided     Minutes non procedure based Comments  
 x Reviewed previous records  
>50% of visit spent in counseling and coordination of care  Discussion with patient and/or family and questions answered Given the patient's current clinical presentation, I have a high level of concern for decompensation if discharged from the ED. Complex decision making was performed which includes reviewing the patient's available past medical records, laboratory results, and Xray films. I have also directly communicated my plan and discussed this case with the involved ED physician.  
 
____________________________________________________________________ Stephan Contreras MD 
 
Procedures: see electronic medical records for all procedures/Xrays and details which were not copied into this note but were reviewed prior to creation of Plan. LAB DATA REVIEWED:   
Recent Results (from the past 24 hour(s)) METABOLIC PANEL, COMPREHENSIVE Collection Time: 10/16/18  8:17 PM  
Result Value Ref Range Sodium 141 136 - 145 mmol/L Potassium 3.8 3.5 - 5.1 mmol/L Chloride 107 97 - 108 mmol/L  
 CO2 28 21 - 32 mmol/L Anion gap 6 5 - 15 mmol/L Glucose 95 65 - 100 mg/dL BUN 26 (H) 6 - 20 MG/DL Creatinine 1.15 (H) 0.55 - 1.02 MG/DL  
 BUN/Creatinine ratio 23 (H) 12 - 20 GFR est AA 54 (L) >60 ml/min/1.73m2 GFR est non-AA 45 (L) >60 ml/min/1.73m2 Calcium 8.9 8.5 - 10.1 MG/DL Bilirubin, total 0.9 0.2 - 1.0 MG/DL  
 ALT (SGPT) 14 12 - 78 U/L  
 AST (SGOT) 17 15 - 37 U/L Alk. phosphatase 63 45 - 117 U/L Protein, total 7.0 6.4 - 8.2 g/dL Albumin 3.3 (L) 3.5 - 5.0 g/dL Globulin 3.7 2.0 - 4.0 g/dL A-G Ratio 0.9 (L) 1.1 - 2.2 CK W/ CKMB & INDEX Collection Time: 10/16/18  8:17 PM  
Result Value Ref Range CK 40 26 - 192 U/L  
 CK - MB <1.0 <3.6 NG/ML  
 CK-MB Index Cannot be calculated 0 - 2.5    
CBC WITH AUTOMATED DIFF Collection Time: 10/16/18  8:17 PM  
Result Value Ref Range WBC 5.8 3.6 - 11.0 K/uL  
 RBC 4.35 3.80 - 5.20 M/uL  
 HGB 13.5 11.5 - 16.0 g/dL HCT 40.1 35.0 - 47.0 % MCV 92.2 80.0 - 99.0 FL  
 MCH 31.0 26.0 - 34.0 PG  
 MCHC 33.7 30.0 - 36.5 g/dL  
 RDW 13.3 11.5 - 14.5 % PLATELET 244 179 - 596 K/uL  MPV 9.3 8.9 - 12.9 FL  
 NRBC 0.0 0  WBC ABSOLUTE NRBC 0.00 0.00 - 0.01 K/uL NEUTROPHILS 60 32 - 75 % LYMPHOCYTES 28 12 - 49 % MONOCYTES 10 5 - 13 % EOSINOPHILS 2 0 - 7 % BASOPHILS 0 0 - 1 % IMMATURE GRANULOCYTES 0 0.0 - 0.5 % ABS. NEUTROPHILS 3.5 1.8 - 8.0 K/UL  
 ABS. LYMPHOCYTES 1.6 0.8 - 3.5 K/UL  
 ABS. MONOCYTES 0.6 0.0 - 1.0 K/UL  
 ABS. EOSINOPHILS 0.1 0.0 - 0.4 K/UL  
 ABS. BASOPHILS 0.0 0.0 - 0.1 K/UL  
 ABS. IMM. GRANS. 0.0 0.00 - 0.04 K/UL  
 DF AUTOMATED    
TROPONIN I Collection Time: 10/16/18  8:17 PM  
Result Value Ref Range Troponin-I, Qt. <0.05 <0.05 ng/mL LACTIC ACID Collection Time: 10/16/18  8:17 PM  
Result Value Ref Range Lactic acid 1.1 0.4 - 2.0 MMOL/L  
MAGNESIUM Collection Time: 10/16/18  8:17 PM  
Result Value Ref Range Magnesium 2.1 1.6 - 2.4 mg/dL URINALYSIS W/ REFLEX CULTURE Collection Time: 10/16/18  9:09 PM  
Result Value Ref Range Color DARK YELLOW Appearance CLOUDY (A) CLEAR Specific gravity 1.030 1.003 - 1.030    
 pH (UA) 5.5 5.0 - 8.0 Protein TRACE (A) NEG mg/dL Glucose NEGATIVE  NEG mg/dL Ketone TRACE (A) NEG mg/dL Blood NEGATIVE  NEG Urobilinogen 1.0 0.2 - 1.0 EU/dL Nitrites NEGATIVE  NEG Leukocyte Esterase SMALL (A) NEG    
 WBC 10-20 0 - 4 /hpf  
 RBC 0-5 0 - 5 /hpf Epithelial cells FEW FEW /lpf Bacteria 4+ (A) NEG /hpf  
 UA:UC IF INDICATED URINE CULTURE ORDERED (A) CNI    
BILIRUBIN, CONFIRM Collection Time: 10/16/18  9:09 PM  
Result Value Ref Range  Bilirubin UA, confirm NEGATIVE  NEG

## 2018-10-17 NOTE — PROGRESS NOTES
Bedside and Verbal shift change report given to Priscila Mckay RN (oncoming nurse) by Cheryle Becerril (offgoing nurse). Report included the following information SBAR, Kardex, Intake/Output and MAR.

## 2018-10-17 NOTE — PROGRESS NOTES
physical Therapy EVALUATION/DISCHARGE Patient: Rojelio Walsh (2 y.o. female) Date: 10/17/2018 Primary Diagnosis: Acute encephalopathy UTI (urinary tract infection) Precautions:   Fall ASSESSMENT : 
Based on the objective data described below, the patient presents with near baseline level function. Patient with advanced dementia and unable to provide any PLOF. A caregiver is present during session and states that patient at baseline is intermittently \"combative\" and will pull hair, bite, kick etc but at other times patient is cooperative. Caregiver states they are always \"right next to her when walking\". Patient currently rather lethargic but will wake to name. Upon attempting to mobilize to EOB patient hitting PT/OT and attempting to kick them. Noted patient IV leaking as it had become detached and attempted to roll patient to put clean pad under her and she continues to kick and hit. Given patient's advanced dementia, behavioral issues, and previous need for 24/7 supervision with all mobility patient is at her baseline and is not cooperative with PT/OT at this time. Recommend return to previous setting at DC vs LTC placement. Skilled physical therapy is not indicated at this time. PLAN : 
[facility] [address] [facility] None Further Equipment Recommendations for Discharge: none SUBJECTIVE:  
Patient stated Don't make me go to school!  OBJECTIVE DATA SUMMARY:  
HISTORY:   
Past Medical History:  
Diagnosis Date  Dementia  Endocrine disease  Goiter  High cholesterol  Hypertension Past Surgical History:  
Procedure Laterality Date  BREAST SURGERY PROCEDURE UNLISTED  HX APPENDECTOMY  HX GI    
 HX GYN    
 hysterectomy  HX ORTHOPAEDIC    
 back fusion  HX THYROIDECTOMY partial - 50 years ago  HX TONSILLECTOMY Prior Level of Function/Home Situation: Baseline dementia and needs 24 hour assist/care Personal factors and/or comorbidities impacting plan of care:  
 
Home Situation Home Environment: Private residence Wheelchair Ramp: Yes One/Two Story Residence: One story Living Alone: No 
Support Systems: Family member(s) Patient Expects to be Discharged to[de-identified] Unknown Current DME Used/Available at Home: Yusuf Lu, [address] [facility] chair Tub or Shower Type: Tub/Shower combination EXAMINATION/PRESENTATION/DECISION MAKING:  
Critical Behavior: 
Neurologic State: Alert, Confused Orientation Level: Disoriented to person, Disoriented to place, Disoriented to time, Disoriented to situation Cognition: Decreased attention/concentration, Decreased command following, Poor safety awareness Safety/Judgement: Decreased awareness of environment, Decreased insight into deficits, Decreased awareness of need for safety, Decreased awareness of need for assistance Hearing: Auditory Auditory Impairment: None Range Of Motion: 
AROM: Generally decreased, functional 
  
  
  
  
  
  
  
Strength:   
Strength: Generally decreased, functional 
  
  
  
  
  
  
Tone & Sensation:  
  
  
  
  
  
  
  
  
  
   
Coordination: 
  
Vision:  
  
Functional Mobility: 
Bed Mobility: 
Rolling: Total assistance;Assist x2 Supine to Sit: Total assistance;Assist x2 Sit to Supine: Total assistance;Assist x2 Transfers: 
  
  
     
  
     
  
  
Balance:  
Sitting: Impaired(pt refused to come to full sit) Ambulation/Gait Training: 
  
  
 
 
Functional Measure: 
Barthel Index: 
 
Bathin Bladder: 0 Bowels: 0 Groomin Dressin Feedin Mobility: 0 Stairs: 0 Toilet Use: 0 Transfer (Bed to Chair and Back): 0 Total: 0 Barthel and G-code impairment scale: 
Percentage of impairment CH 
0% CI 
1-19% CJ 
20-39% CK 
40-59% CL 
60-79% CM 
80-99% CN 
100% Barthel Score 0-100 100 99-80 79-60 59-40 20-39 1-19 
 0 Barthel Score 0-20 20 17-19 13-16 9-12 5-8 1-4 0 The Barthel ADL Index: Guidelines 1. The index should be used as a record of what a patient does, not as a record of what a patient could do. 2. The main aim is to establish degree of independence from any help, physical or verbal, however minor and for whatever reason. 3. The need for supervision renders the patient not independent. 4. A patient's performance should be established using the best available evidence. Asking the patient, friends/relatives and nurses are the usual sources, but direct observation and common sense are also important. However direct testing is not needed. 5. Usually the patient's performance over the preceding 24-48 hours is important, but occasionally longer periods will be relevant. 6. Middle categories imply that the patient supplies over 50 per cent of the effort. 7. Use of aids to be independent is allowed. Alexx Weston., Barthel, BHARAT. ([id number]). Functional evaluation: the Barthel Index. [address] [facility] (14)2. Wally Mortimer elizabeth ALEM Hollins, Herbert Ruiz., Adina Bernheim., [place], [address] [place] [address] (1999). Measuring the change indisability after inpatient rehabilitation; comparison of the responsiveness of the Barthel Index and Functional [place] Measure. Journal of Neurology, Neurosurgery, and Psychiatry, 66(4), 262-354. Faizan June, N.[place].A, SITA Tapia, & Miladis Ryder MELMO. (2004.) Assessment of post-stroke quality of life in cost-effectiveness studies: The usefulness of the Barthel Index and the EuroQoL-5D. [facility], 13, 025-21 G codes: In compliance with CMSs Claims Based Outcome Reporting, the following G-code set was chosen for this patient based on their primary functional limitation being treated: The outcome measure chosen to determine the severity of the functional limitation was the Barthel with a score of 0/100 which was correlated with the impairment scale. ? Mobility - Walking and Moving Around:  
  - CURRENT STATUS: CN - 100% impaired, limited or restricted  - GOAL STATUS: CN - 100% impaired, limited or restricted  - D/C STATUS:  CN - 100% impaired, limited or restricted Pain: 
Pain Scale 1: Adult Nonverbal Pain Scale Pain Intensity 1: 0 Activity Tolerance: VSS Please refer to the flowsheet for vital signs taken during this treatment. After treatment:  
[]   Patient left in no apparent distress sitting up in chair 
[x]   Patient left in no apparent distress in bed 
[x]   Call bell left within reach [x]   Nursing notified 
[x]   Caregiver present [x]   Bed alarm activated COMMUNICATION/EDUCATION:  
Communication/Collaboration: 
[]   Fall prevention education was provided and the patient/caregiver indicated understanding. []   Patient/family have participated as able and agree with findings and recommendations. [x]   Patient is unable to participate in plan of care at this time. Findings and recommendations were discussed with: Physical Therapist, Occupational Therapist and Registered Nurse Thank you for this referral. 
Celine Mckeon, PT, DPT Time Calculation: 15 mins

## 2018-10-17 NOTE — PROGRESS NOTES
Problem: Falls - Risk of 
Goal: *Absence of Falls Document Jt Kins Fall Risk and appropriate interventions in the flowsheet. Outcome: Progressing Towards Goal 
Fall Risk Interventions: 
  
 
  
 
  
 
  
 
  
 
 
 
Problem: Pressure Injury - Risk of 
Goal: *Prevention of pressure injury Document Kobi Scale and appropriate interventions in the flowsheet. Outcome: Progressing Towards Goal 
Pressure Injury Interventions: 
Sensory Interventions: Assess changes in LOC, Check visual cues for pain, Discuss PT/OT consult with provider, Float heels, Keep linens dry and wrinkle-free, Maintain/enhance activity level, Minimize linen layers, Pressure redistribution bed/mattress (bed type), Turn and reposition approx. every two hours (pillows and wedges if needed) Moisture Interventions: Check for incontinence Q2 hours and as needed, Maintain skin hydration (lotion/cream), Offer toileting Q_hr Activity Interventions: Increase time out of bed, Pressure redistribution bed/mattress(bed type), PT/OT evaluation Mobility Interventions: Float heels, HOB 30 degrees or less, Pressure redistribution bed/mattress (bed type), PT/OT evaluation Nutrition Interventions: Document food/fluid/supplement intake, Discuss nutritional consult with provider, Offer support with meals,snacks and hydration

## 2018-10-17 NOTE — PROGRESS NOTES
Occupational Therapy EVALUATION/discharge Patient: Farheen Junior (17 y.o. female) Date: 10/17/2018 Primary Diagnosis: Acute encephalopathy UTI (urinary tract infection) Precautions:  Fall ASSESSMENT:  
Based on the objective data described below, the patient presents with significant deficits in all areas of self-care. Patient lives with her brother but has 25 hour caregivers. Her home caregiver indicates that she has been total assist for dressing and bathing for about 2 years. She was able to ambulate to/from the bathroom using the RW with CG/SBA. Caregiver also indicates that the patient is sometimes able to feed herself, but she does not initiate self-feeding. They usually assist her to make sure she eats. Patient has been combative and agitated at home, even recently started biting. Patient is currently not following any functional commands and gets agitated and combative with attempts to move to the edge of the bed. She returned to supine and turned side to side with Total A of 2 to change the pad underneath of her. At this point, she is not able to benefit from skilled therapy services, especially considering she is at/near her ADL baseline. Will complete the OT order and sign off. Discharge Recommendations: Long term care Further Equipment Recommendations for Discharge: Defer to facility SUBJECTIVE:  
Patient stated Leave me alone. I don't want to go to school.  OBJECTIVE DATA SUMMARY:  
HISTORY:  
Past Medical History:  
Diagnosis Date  Dementia  Endocrine disease  Goiter  High cholesterol  Hypertension Past Surgical History:  
Procedure Laterality Date  BREAST SURGERY PROCEDURE UNLISTED  HX APPENDECTOMY  HX GI    
 HX GYN    
 hysterectomy  HX ORTHOPAEDIC    
 back fusion  HX THYROIDECTOMY partial - 50 years ago  HX TONSILLECTOMY Prior Level of Function/Environment/Context: Patient lives with her brother and has caregivers 24/7. Per home caregiver, she has been total assist for dressing and bathing for about 2 years; she was able to ambulate to/from the bathroom using the RW with CG/SBA. Caregiver also indicates that the patient is sometimes able to feed herself, but she does not initiate self-feeding. They usually assist her to make sure she eats. Patient has been combative and agitated at home, even recently started biting. Expanded or extensive additional review of patient history: history of dementia Home Situation Home Environment: Private residence Wheelchair Ramp: Yes One/Two Story Residence: One story Living Alone: No 
Support Systems: Family member(s) Patient Expects to be Discharged to[de-identified] Unknown Current DME Used/Available at Home: Yusuf Morse, [address] [facility] chair Tub or Shower Type: Tub/Shower combination Hand dominance: Right EXAMINATION OF PERFORMANCE DEFICITS: 
Cognitive/Behavioral Status: 
Neurologic State: Alert;Confused Orientation Level: Disoriented to person;Disoriented to place; Disoriented to time;Disoriented to situation Cognition: Decreased attention/concentration;Decreased command following;Poor safety awareness Safety/Judgement: Decreased awareness of environment;Decreased insight into deficits; Decreased awareness of need for safety;Decreased awareness of need for assistance Hearing: Auditory Auditory Impairment: None Vision/Perceptual:   
Unable to assess Range of Motion: 
Unable to formally assess, but the patient was swinging both arms at therapist.  No active shoulder flexion or abduction observed but otherwise appears grossly functional. 
  
  
  
  
  
  
  
  
Strength: 
Not tested Balance: 
Sitting:  Patient did not stay on the edge of the bed long enough to fully assess Standing:  Not tested Functional Mobility and Transfers for ADLs:Bed Mobility: 
Rolling:  Total assistance;Assist x2 
 Supine to Sit: Total assistance;Assist x2 Sit to Supine: Total assistance;Assist x2 Transfers: ADL Assessment: 
Feeding: Total assistance Oral Facial Hygiene/Grooming: Total assistance Bathing: Total assistance;Assist x2 Upper Body Dressing: Total assistance;Assist x2 Lower Body Dressing: Total assistance;Assist x2 Toileting: Total assistance;Assist x2 Functional Measure: 
Barthel Index: 
 
Bathin Bladder: 0 Bowels: 0 Groomin Dressin Feedin Mobility: 0 Stairs: 0 Toilet Use: 0 Transfer (Bed to Chair and Back): 0 Total: 0 Barthel and G-code impairment scale: 
Percentage of impairment CH 
0% CI 
1-19% CJ 
20-39% CK 
40-59% CL 
60-79% CM 
80-99% CN 
100% Barthel Score 0-100 100 99-80 79-60 59-40 20-39 1-19 
 0 Barthel Score 0-20 20 17-19 13-16 9-12 5-8 1-4 0 The Barthel ADL Index: Guidelines 1. The index should be used as a record of what a patient does, not as a record of what a patient could do. 2. The main aim is to establish degree of independence from any help, physical or verbal, however minor and for whatever reason. 3. The need for supervision renders the patient not independent. 4. A patient's performance should be established using the best available evidence. Asking the patient, friends/relatives and nurses are the usual sources, but direct observation and common sense are also important. However direct testing is not needed. 5. Usually the patient's performance over the preceding 24-48 hours is important, but occasionally longer periods will be relevant. 6. Middle categories imply that the patient supplies over 50 per cent of the effort. 7. Use of aids to be independent is allowed. Alexx Weston., Barthel, DAnnetteW. (7798). Functional evaluation: the Barthel Index. [facility] (14)2. Wally Mortimer der [place], J.J.M.F, Herbert Ruiz., Adina Bernheim., Greg[place], [address] [place] [address] Ave ().  Measuring the change indisability after inpatient rehabilitation; comparison of the responsiveness of the Barthel Index and Functional Coy Measure. Journal of Neurology, Neurosurgery, and Psychiatry, 66(4), 614-724. FREDA Contreras, SITA Tapia, & Keven Neal M.A. (2004.) Assessment of post-stroke quality of life in cost-effectiveness studies: The usefulness of the Barthel Index and the EuroQoL-5D. [facility], 13, 130-48 G codes: In compliance with CMSs Claims Based Outcome Reporting, the following G-code set was chosen for this patient based on their primary functional limitation being treated: The outcome measure chosen to determine the severity of the functional limitation was the Barthel Index with a score of 0/100 which was correlated with the impairment scale. ? Self Care:  
  - CURRENT STATUS: CN - 100% impaired, limited or restricted  - GOAL STATUS: CN - 100% impaired, limited or restricted  - D/C STATUS:  CN - 100% impaired, limited or restricted Occupational Therapy Evaluation Charge Determination History Examination Decision-Making HIGH Complexity : Extensive review of history including physical, cognitive and psychosocial history  HIGH Complexity : 5 or more performance deficits relating to physical, cognitive , or psychosocial skils that result in activity limitations and / or participation restrictions HIGH Complexity : Patient presents with comorbidities that affect occupational performance. Signifigant modification of tasks or assistance (eg, physical or verbal) with assessment (s) is necessary to enable patient to complete evaluation Based on the above components, the patient evaluation is determined to be of the following complexity level: HIGH Pain: 
Pain Scale 1: Adult Nonverbal Pain Scale Pain Intensity 1: 0 Activity Tolerance:  
Poor After treatment:  
[]  Patient left in no apparent distress sitting up in chair [x]  Patient left in no apparent distress in bed 
[x]  Call bell left within reach [x]  Nursing notified 
[x]  Caregiver present 
[]  Bed alarm activated COMMUNICATION/EDUCATION:  
Communication/Collaboration: 
[x]      Home safety education was provided and the patient/caregiver indicated understanding. [x]      Patient/family have participated as able and agree with findings and recommendations. []      Patient is unable to participate in plan of care at this time. Findings and recommendations were discussed with: Physical Therapist and Registered Nurse ALDO Bethea/L Time Calculation: 23 mins

## 2018-10-17 NOTE — ROUTINE PROCESS
TRANSFER - OUT REPORT: 
 
Verbal report given to Tae(name) on Gabriela Rosario  being transferred to Ortho(unit) for routine progression of care Report consisted of patients Situation, Background, Assessment and  
Recommendations(SBAR). Information from the following report(s) SBAR was reviewed with the receiving nurse. Lines:  
Peripheral IV 10/16/18 Left Antecubital (Active) Opportunity for questions and clarification was provided. Patient transported with: 
 [employer]

## 2018-10-17 NOTE — PROGRESS NOTES
Initial Nutrition Assessment: 
 
INTERVENTIONS/RECOMMENDATIONS:  
· Meals/Snacks: General/healthful diet:  Continue regular diet with assistance for optimal intake. Per caregiver, has an excellent appetite but will not take the initiative due to mental status to set up tray/condiments/cut foods. Once all of that is done, pt can feed self. · Caregiver prefers to eat meals later at breakfast to coordinate when caregiver can assist. 
ASSESSMENT:  
10/17:  Chart reviewed; med noted for UTI, acute encephalopathy, h/o diverticulitis, dehydration, gout, thyroidectomy. Pt tolerating a regular diet. Requires some level of assistance with setting up meals, cutting items. Caregiver usually with pt. Prefers breakfast to be delivered a little later in meal period to coordinate when caregiver can be present. Per caregiver, pt usually has a very good appetite. No supplements indicated at this time. Diet Order: Regular 
% Eaten:  No data found. Pertinent Medications: [x]Reviewed []Other: D3, lovenox, IVF Pertinent Labs: [x]Reviewed []Other: Creat 1.15 Food Allergies: [x]None []Other Last BM:    [x]Active     []Hyperactive  []Hypoactive       [] Absent BS Skin:    [] Intact   [] Incision  [] Breakdown  [] Other: Anthropometrics:  
Height: 5' 9\" (175.3 cm) Weight: 83.8 kg (184 lb 11.9 oz) IBW (%IBW):   ( ) UBW (%UBW):   (  %) Last Weight Metrics: 
Weight Loss Metrics 10/16/2018 6/27/2016 3/10/2013 7/12/2012 4/4/2012 12/22/2011 9/1/2011 Today's Wt 184 lb 11.9 oz 160 lb 4.4 oz 156 lb 6.4 oz 170 lb 3.1 oz 171 lb 9.6 oz 172 lb 9.6 oz 175 lb BMI 27.28 kg/m2 25.1 kg/m2 23.79 kg/m2 25.12 kg/m2 26.1 kg/m2 26.25 kg/m2 26.61 kg/m2 BMI: Body mass index is 27.28 kg/m². This BMI is indicative of: 
 []Underweight    []Normal    [x]Overweight    [] Obesity   [] Extreme Obesity (BMI>40) Estimated Nutrition Needs (Based on):  
5886 Kcals/day(BMR (0676) x 1. 3AF) , 84 g(1.0 g/kg bw) Protein Carbohydrate: At Least 130 g/day  Fluids: 1700 mL/day (1ml/kcal) NUTRITION DIAGNOSES:  
Problem:  No nutritional diagnosis at this time Etiology: related to Signs/Symptoms: as evidenced by NUTRITION INTERVENTIONS: 
Meals/Snacks: General/healthful diet GOAL:  
PO intake at least 50% of meals next 3-5 days LEARNING NEEDS (Diet, Food/Nutrient-Drug Interaction):  
 [x] None Identified 
 [] Identified and Education Provided/Documented 
 [] Identified and Pt declined/was not appropriate Cultureal, Yazidism, OR Ethnic Dietary Needs:  
 [x] None Identified 
 [] Identified and Addressed 
 
 [x] Interdisciplinary Care Plan Reviewed/Documented  
 [x] Discharge Planning:  Continue regular diet MONITORING /EVALUATION:  
Food/Nutrient Intake Outcomes: Total energy intake Physical Signs/Symptoms Outcomes: Weight/weight change NUTRITION RISK:  
 [x] Patient At Nutritional Risk   
 [] Patient Not At Nutritional Risk PT SEEN FOR:  
 []  MD Consult: []Calorie Count []Diabetic Diet Education []Diet Education []Electrolyte Management []General Nutrition Management and Supplements []Management of Tube Feeding []TPN Recommendations [x]  RN Referral:  [x]MST score >=2 
   []Enteral/Parenteral Nutrition PTA []Pregnant: Gestational DM or Multigestation 
   []Pressure Ulcer/Wound Care needs 
     
[]  Low BMI 
[]  ROBSON Jovel RD Pager [phone] Weekend Pager [phone]

## 2018-10-17 NOTE — CDMP QUERY
Account Number: [de-identified] MRN: [id number] Patient: Allison Rivero 
Created: 2018-10-17T12:48:00 Clinician Name: Baudilio Buchanan : 
Please clarify if this patient is (was) being treated/managed for:  
 
=> Metabolic encephalopathy as evidenced by uti, confused, combative, less verbal req Rocephin, ivfs @ 75, NS1L 
=> Other explanation of clinical findings 
=> Clinically Undetermined (no explanation for clinical findings) The medical record reflects the following clinical findings, treatment, and risk factors. Risk Factors:  87f adm w/ uti, falls Clinical Indicators:  10/17--nursing \"confused, combative\"  gcs 13, H&P--Acute encephalopathy--typically is alert, talkative and ambulates with a walker. Lately she has been appearing sleepy, less verbal 
Treatment: Rocephin, ivfs @ 75, NS1L Please clarify and document your clinical opinion in the progress notes and discharge summary including the definitive and/or presumptive diagnosis, (suspected or probable), related to the above clinical findings. Please include clinical findings supporting your diagnosis. Thank Sukh Boateng Rn/CDMP

## 2018-10-18 LAB
ALBUMIN SERPL-MCNC: 2.8 G/DL (ref 3.5–5)
ALBUMIN/GLOB SERPL: 0.8 {RATIO} (ref 1.1–2.2)
ALP SERPL-CCNC: 60 U/L (ref 45–117)
ALT SERPL-CCNC: 18 U/L (ref 12–78)
ANION GAP SERPL CALC-SCNC: 9 MMOL/L (ref 5–15)
AST SERPL-CCNC: 26 U/L (ref 15–37)
BACTERIA SPEC CULT: ABNORMAL
BASOPHILS # BLD: 0 K/UL (ref 0–0.1)
BASOPHILS NFR BLD: 0 % (ref 0–1)
BILIRUB SERPL-MCNC: 0.7 MG/DL (ref 0.2–1)
BUN SERPL-MCNC: 20 MG/DL (ref 6–20)
BUN/CREAT SERPL: 25 (ref 12–20)
CALCIUM SERPL-MCNC: 8.4 MG/DL (ref 8.5–10.1)
CC UR VC: ABNORMAL
CHLORIDE SERPL-SCNC: 110 MMOL/L (ref 97–108)
CO2 SERPL-SCNC: 21 MMOL/L (ref 21–32)
CREAT SERPL-MCNC: 0.8 MG/DL (ref 0.55–1.02)
DIFFERENTIAL METHOD BLD: ABNORMAL
EOSINOPHIL # BLD: 0.2 K/UL (ref 0–0.4)
EOSINOPHIL NFR BLD: 3 % (ref 0–7)
ERYTHROCYTE [DISTWIDTH] IN BLOOD BY AUTOMATED COUNT: 13.2 % (ref 11.5–14.5)
GLOBULIN SER CALC-MCNC: 3.5 G/DL (ref 2–4)
GLUCOSE SERPL-MCNC: 110 MG/DL (ref 65–100)
HCT VFR BLD AUTO: 39.2 % (ref 35–47)
HGB BLD-MCNC: 12.7 G/DL (ref 11.5–16)
IMM GRANULOCYTES # BLD: 0 K/UL (ref 0–0.04)
IMM GRANULOCYTES NFR BLD AUTO: 0 % (ref 0–0.5)
LYMPHOCYTES # BLD: 1.5 K/UL (ref 0.8–3.5)
LYMPHOCYTES NFR BLD: 26 % (ref 12–49)
MCH RBC QN AUTO: 30.5 PG (ref 26–34)
MCHC RBC AUTO-ENTMCNC: 32.4 G/DL (ref 30–36.5)
MCV RBC AUTO: 94.2 FL (ref 80–99)
MONOCYTES # BLD: 0.5 K/UL (ref 0–1)
MONOCYTES NFR BLD: 9 % (ref 5–13)
NEUTS SEG # BLD: 3.5 K/UL (ref 1.8–8)
NEUTS SEG NFR BLD: 62 % (ref 32–75)
NRBC # BLD: 0 K/UL (ref 0–0.01)
NRBC BLD-RTO: 0 PER 100 WBC
PLATELET # BLD AUTO: 146 K/UL (ref 150–400)
PMV BLD AUTO: 9.7 FL (ref 8.9–12.9)
POTASSIUM SERPL-SCNC: 3.8 MMOL/L (ref 3.5–5.1)
PROT SERPL-MCNC: 6.3 G/DL (ref 6.4–8.2)
RBC # BLD AUTO: 4.16 M/UL (ref 3.8–5.2)
SERVICE CMNT-IMP: ABNORMAL
SODIUM SERPL-SCNC: 140 MMOL/L (ref 136–145)
T4 FREE SERPL-MCNC: 1.1 NG/DL (ref 0.8–1.5)
TSH SERPL DL<=0.05 MIU/L-ACNC: 0.41 UIU/ML (ref 0.36–3.74)
URATE SERPL-MCNC: 5.9 MG/DL (ref 2.6–6)
WBC # BLD AUTO: 5.7 K/UL (ref 3.6–11)

## 2018-10-18 PROCEDURE — 65270000029 HC RM PRIVATE

## 2018-10-18 PROCEDURE — 85025 COMPLETE CBC W/AUTO DIFF WBC: CPT | Performed by: INTERNAL MEDICINE

## 2018-10-18 PROCEDURE — 84443 ASSAY THYROID STIM HORMONE: CPT | Performed by: INTERNAL MEDICINE

## 2018-10-18 PROCEDURE — 74011250637 HC RX REV CODE- 250/637: Performed by: INTERNAL MEDICINE

## 2018-10-18 PROCEDURE — 84550 ASSAY OF BLOOD/URIC ACID: CPT | Performed by: INTERNAL MEDICINE

## 2018-10-18 PROCEDURE — 36415 COLL VENOUS BLD VENIPUNCTURE: CPT | Performed by: INTERNAL MEDICINE

## 2018-10-18 PROCEDURE — 80053 COMPREHEN METABOLIC PANEL: CPT | Performed by: INTERNAL MEDICINE

## 2018-10-18 PROCEDURE — 94760 N-INVAS EAR/PLS OXIMETRY 1: CPT

## 2018-10-18 PROCEDURE — 84439 ASSAY OF FREE THYROXINE: CPT | Performed by: INTERNAL MEDICINE

## 2018-10-18 PROCEDURE — 74011250636 HC RX REV CODE- 250/636: Performed by: INTERNAL MEDICINE

## 2018-10-18 RX ORDER — NITROFURANTOIN 25; 75 MG/1; MG/1
100 CAPSULE ORAL EVERY 12 HOURS
Status: DISCONTINUED | OUTPATIENT
Start: 2018-10-18 | End: 2018-10-20 | Stop reason: HOSPADM

## 2018-10-18 RX ORDER — AMOXICILLIN AND CLAVULANATE POTASSIUM 500; 125 MG/1; MG/1
1 TABLET, FILM COATED ORAL EVERY 8 HOURS
Status: DISCONTINUED | OUTPATIENT
Start: 2018-10-18 | End: 2018-10-18 | Stop reason: DRUGHIGH

## 2018-10-18 RX ORDER — AMOXICILLIN AND CLAVULANATE POTASSIUM 500; 125 MG/1; MG/1
1 TABLET, FILM COATED ORAL EVERY 8 HOURS
Status: DISCONTINUED | OUTPATIENT
Start: 2018-10-18 | End: 2018-10-18

## 2018-10-18 RX ADMIN — NITROFURANTOIN MONOHYDRATE/MACROCRYSTALLINE 100 MG: 25; 75 CAPSULE ORAL at 21:15

## 2018-10-18 RX ADMIN — NITROFURANTOIN MONOHYDRATE/MACROCRYSTALLINE 100 MG: 25; 75 CAPSULE ORAL at 14:26

## 2018-10-18 RX ADMIN — ENOXAPARIN SODIUM 30 MG: 30 INJECTION, SOLUTION INTRAVENOUS; SUBCUTANEOUS at 09:44

## 2018-10-18 NOTE — PROGRESS NOTES
Hospitalist Progress Note NAME: Gabriela Rosario :  1931 MRN:  [id number] Assessment / Plan: 
Acute encephalopathy Generalized weakness, s/p falls 
suspect underlying dementia. recent fall but exam nonfocal and CT head nothing acute. AMS/ weakness likely metabolic and multifactorial from UTI, dehydration. UTI 
culture is back with the following report AEROCOCCUS URINAE A. URINAE HAS BEEN DESCRIBED AS SUSCEPTIBLE TO PENICILLIN, AMOXICILLIN, PIPERACILLIN, CEFIPIME, RIFAMPIN AND NITROFURANTOIN, BUT RESISTANT TO SULFONAMIDES. placed on Augmentin . Dehydration Continue the IVF hydration. Pain and swelling on left  Foot ?doubt Acute gout. it could be soft tissue injury from fall. Pain and swelling better. Given a dose of  colchicine 1.2mg x 1 in ER ,  
Xray just soft tissue swelling on the dorsum of foot  
uric acid level  Normal. 
Pain med for now PT and case managment on DC planning. [facility] [place] Depression / Anxiety 
continue prozac and buspar Hx Partial Thyroidectomy TSH is low , will wait free t4 and t3 result. Body mass index is 27.28 kg/m².: 25.0 - 29.9 Overweight Code status: DNR Prophylaxis: Lovenox Recommended Disposition: tbd Subjective: Chief Complaint / Reason for Physician Visit \"mild confusion still going on no any other complaints  \". Discussed with RN events overnight. Review of Systems: 
Symptom Y/N Comments  Symptom Y/N Comments Fever/Chills    Chest Pain Poor Appetite    Edema Cough    Abdominal Pain Sputum    Joint Pain SOB/JAIN    Pruritis/Rash Nausea/vomit    Tolerating PT/OT Diarrhea    Tolerating Diet Constipation    Other Could NOT obtain due to: AMS Objective: VITALS:  
Last 24hrs VS reviewed since prior progress note. Most recent are: 
Patient Vitals for the past 24 hrs: 
 Temp Pulse Resp BP SpO2  
10/18/18 0744 97.7 °F (36.5 °C) 91 18 150/87  10/18/18 0413 98.3 °F (36.8 °C) 89 18 (!) 169/102 92 % 10/17/18 2017 97.7 °F (36.5 °C) 79 20 142/75 97 % 10/17/18 1814 98.5 °F (36.9 °C) 79 20 154/90 95 % 10/17/18 1219 98.1 °F (36.7 °C) 72 18 126/66 95 % Intake/Output Summary (Last 24 hours) at 10/18/2018 0941 Last data filed at 10/18/2018 7839 Gross per 24 hour Intake 900 ml Output  Net 900 ml PHYSICAL EXAM: 
General: WD, WN. Alert, cooperative, no acute distress   
EENT:  EOMI. Anicteric sclerae. MMM Resp:  CTA bilaterally, no wheezing or rales. No accessory muscle use CV:  Regular  rhythm,  No edema GI:  Soft, Non distended, Non tender.  +Bowel sounds Neurologic:  Mild confusion noted Left foot swollen specially close to the base of 1 toe and ankle area. bettr than previous days Reviewed most current lab test results and cultures  YES Reviewed most current radiology test results   YES Review and summation of old records today    NO Reviewed patient's current orders and MAR    YES 
PMH/SH reviewed - no change compared to H&P 
________________________________________________________________________ Care Plan discussed with: 
  Comments Patient y Family RN y   
Care Manager Consultant Multidiciplinary team rounds were held today with , nursing, pharmacist and clinical coordinator. Patient's plan of care was discussed; medications were reviewed and discharge planning was addressed. ________________________________________________________________________ Total NON critical care TIME:  30    Minutes Total CRITICAL CARE TIME Spent:   Minutes non procedure based Comments >50% of visit spent in counseling and coordination of care    
________________________________________________________________________ Mariajose Guzman MD  
 
Procedures: see electronic medical records for all procedures/Xrays and details which were not copied into this note but were reviewed prior to creation of Plan. LABS: 
I reviewed today's most current labs and imaging studies. Pertinent labs include: 
Recent Labs 10/18/18 
0456 10/16/18 
2017 WBC 5.7 5.8 HGB 12.7 13.5 HCT 39.2 40.1 * 150 Recent Labs 10/18/18 
0456 10/16/18 
2017  141  
K 3.8 3.8 * 107 CO2 21 28 * 95 BUN 20 26* CREA 0.80 1.15* CA 8.4* 8.9 MG  --  2.1 ALB 2.8* 3.3* TBILI 0.7 0.9 SGOT 26 17 ALT 18 14 Signed:  Camila Castillo MD

## 2018-10-18 NOTE — PROGRESS NOTES
Spiritual Care Partner Volunteer visited patient in [place] on 10/18/18. Documented by: 
Karlie Brown M.Div.  Paging Service [phone])

## 2018-10-19 PROCEDURE — 74011250636 HC RX REV CODE- 250/636: Performed by: INTERNAL MEDICINE

## 2018-10-19 PROCEDURE — 74011000250 HC RX REV CODE- 250: Performed by: INTERNAL MEDICINE

## 2018-10-19 PROCEDURE — 74011250637 HC RX REV CODE- 250/637: Performed by: INTERNAL MEDICINE

## 2018-10-19 PROCEDURE — 65270000029 HC RM PRIVATE

## 2018-10-19 RX ORDER — LABETALOL HCL 20 MG/4 ML
20 SYRINGE (ML) INTRAVENOUS ONCE
Status: DISCONTINUED | OUTPATIENT
Start: 2018-10-19 | End: 2018-10-19 | Stop reason: SDUPTHER

## 2018-10-19 RX ORDER — LABETALOL HYDROCHLORIDE 5 MG/ML
20 INJECTION, SOLUTION INTRAVENOUS ONCE
Status: COMPLETED | OUTPATIENT
Start: 2018-10-19 | End: 2018-10-19

## 2018-10-19 RX ADMIN — BUSPIRONE HYDROCHLORIDE 5 MG: 10 TABLET ORAL at 18:08

## 2018-10-19 RX ADMIN — ACETAMINOPHEN 650 MG: 325 TABLET ORAL at 20:09

## 2018-10-19 RX ADMIN — LABETALOL HYDROCHLORIDE: 5 INJECTION INTRAVENOUS at 23:18

## 2018-10-19 RX ADMIN — NITROFURANTOIN MONOHYDRATE/MACROCRYSTALLINE 100 MG: 25; 75 CAPSULE ORAL at 20:09

## 2018-10-19 NOTE — PROGRESS NOTES
Problem: Falls - Risk of 
Goal: *Absence of Falls Document Larissa Lopez Fall Risk and appropriate interventions in the flowsheet. Outcome: Progressing Towards Goal 
Fall Risk Interventions: 
  
 
Mentation Interventions: Adequate sleep, hydration, pain control, Bed/chair exit alarm, Door open when patient unattended, Family/sitter at bedside, More frequent rounding, Room close to nurse's station Medication Interventions: Bed/chair exit alarm Elimination Interventions: Bed/chair exit alarm, Call light in reach, Toileting schedule/hourly rounds History of Falls Interventions: Bed/chair exit alarm, Door open when patient unattended, Room close to nurse's station Problem: Pressure Injury - Risk of 
Goal: *Prevention of pressure injury Document Kobi Scale and appropriate interventions in the flowsheet. Outcome: Progressing Towards Goal 
Pressure Injury Interventions: 
Sensory Interventions: Assess changes in LOC, Avoid rigorous massage over bony prominences, Check visual cues for pain, Keep linens dry and wrinkle-free, Float heels, Minimize linen layers, Pad between skin to skin, Turn and reposition approx. every two hours (pillows and wedges if needed) Moisture Interventions: Absorbent underpads, Apply protective barrier, creams and emollients, Check for incontinence Q2 hours and as needed, Maintain skin hydration (lotion/cream), Moisture barrier Activity Interventions: Increase time out of bed, Pressure redistribution bed/mattress(bed type), PT/OT evaluation Mobility Interventions: Assess need for specialty bed, Float heels, PT/OT evaluation, Turn and reposition approx. every two hours(pillow and wedges) Nutrition Interventions: Document food/fluid/supplement intake Friction and Shear Interventions: Feet elevated on foot rest, HOB 30 degrees or less, Lift sheet, Minimize layers

## 2018-10-19 NOTE — PROGRESS NOTES
Hospitalist Progress Note NAME: Gabriela Rosario :  1931 MRN:  [id number] Assessment / Plan: 
Acute encephalopathy Generalized weakness, s/p falls 
suspect underlying dementia. recent fall but exam nonfocal and CT head nothing acute. AMS/ weakness likely metabolic and multifactorial from UTI, dehydration. UTI 
culture is back with the following report AEROCOCCUS URINAE A. URINAE HAS BEEN DESCRIBED AS SUSCEPTIBLE TO PENICILLIN, AMOXICILLIN, PIPERACILLIN, CEFIPIME, RIFAMPIN AND NITROFURANTOIN, BUT RESISTANT TO SULFONAMIDES. cont Augmentin . Dehydration Continue the IVF hydration. L foot cellulitis Pain and swelling better. Cont augmentin Depression / Anxiety 
continue prozac and buspar Body mass index is 27.28 kg/m².: 25.0 - 29.9 Overweight Code status: DNR Prophylaxis: Lovenox Recommended Disposition: tbd Subjective: Chief Complaint / Reason for Physician Visit Pt demented. Meaningful history unable to be obtained. Discussed care with caregiver and pt's niece. Cellulitis improving. Disposition pending. Review of Systems: 
Symptom Y/N Comments  Symptom Y/N Comments Fever/Chills    Chest Pain Poor Appetite    Edema Cough    Abdominal Pain Sputum    Joint Pain SOB/JAIN    Pruritis/Rash Nausea/vomit    Tolerating PT/OT Diarrhea    Tolerating Diet Constipation    Other Could NOT obtain due to: AMS Objective: VITALS:  
Last 24hrs VS reviewed since prior progress note. Most recent are: 
Patient Vitals for the past 24 hrs: 
 Temp Pulse Resp BP SpO2  
10/19/18 0949 98.3 °F (36.8 °C) 92 16 (!) 145/99 95 % 10/19/18 0334  89 18  94 % 10/18/18 2106 97.8 °F (36.6 °C) 72 18 153/65 91 % 10/18/18 1600 97.8 °F (36.6 °C) 87 16 147/84 95 % No intake or output data in the 24 hours ending 10/19/18 1412 PHYSICAL EXAM: 
General: WD, WN. Alert, cooperative, no acute distress   
EENT:  EOMI. Anicteric sclerae. MMM Resp: CTA bilaterally, no wheezing or rales. No accessory muscle use CV:  Regular  rhythm,  No edema GI:  Soft, Non distended, Non tender.  +Bowel sounds Neurologic:  Confused, not oriented Left foot swollen specially close to the base of 1 toe and ankle area. bettr than previous days Reviewed most current lab test results and cultures  YES Reviewed most current radiology test results   YES Review and summation of old records today    NO Reviewed patient's current orders and MAR    YES 
PMH/SH reviewed - no change compared to H&P 
________________________________________________________________________ Care Plan discussed with: 
  Comments Patient y Family RN y   
Care Manager Consultant Multidiciplinary team rounds were held today with , nursing, pharmacist and clinical coordinator. Patient's plan of care was discussed; medications were reviewed and discharge planning was addressed. ________________________________________________________________________ Total NON critical care TIME:  30    Minutes Total CRITICAL CARE TIME Spent:   Minutes non procedure based Comments >50% of visit spent in counseling and coordination of care    
________________________________________________________________________ Zaid Renteria MD  
 
Procedures: see electronic medical records for all procedures/Xrays and details which were not copied into this note but were reviewed prior to creation of Plan. LABS: 
I reviewed today's most current labs and imaging studies. Pertinent labs include: 
Recent Labs 10/18/18 
0456 10/16/18 
2017 WBC 5.7 5.8 HGB 12.7 13.5 HCT 39.2 40.1 * 150 Recent Labs 10/18/18 
0456 10/16/18 
2017  141  
K 3.8 3.8 * 107 CO2 21 28 * 95 BUN 20 26* CREA 0.80 1.15* CA 8.4* 8.9 MG  --  2.1 ALB 2.8* 3.3* TBILI 0.7 0.9 SGOT 26 17 ALT 18 14 Signed: Lytle Fabry, MD

## 2018-10-19 NOTE — PROGRESS NOTES
Pt has been accepted to [facility] for SNF [facility]). CM placed referral for transportation to [facility] via [employer] for 10:00 AM. [facility] paperwork filled out and placed on chart. Family and RN informed. Vanessa Care informed through [facility]. No further CM needs identified. Care Management Interventions PCP Verified by CM: Yes Mode of Transport at Discharge: BLS Transition of Care Consult (CM Consult): SNF Partner SNF: Yes MyChart Signup: No 
Discharge Durable Medical Equipment: No 
Health Maintenance Reviewed: Yes Physical Therapy Consult: Yes Occupational Therapy Consult: Yes Speech Therapy Consult: No 
Current Support Network: Lives with Caregiver, [place] Confirm Follow Up Transport: Family Plan discussed with Pt/Family/Caregiver: Yes Freedom of Choice Offered: Yes Discharge Location Discharge Placement: Skilled nursing facility [facility], CM [facility] [phone]

## 2018-10-20 VITALS
HEIGHT: 69 IN | SYSTOLIC BLOOD PRESSURE: 154 MMHG | DIASTOLIC BLOOD PRESSURE: 82 MMHG | WEIGHT: 184.75 LBS | HEART RATE: 72 BPM | RESPIRATION RATE: 18 BRPM | OXYGEN SATURATION: 94 % | TEMPERATURE: 98 F | BODY MASS INDEX: 27.36 KG/M2

## 2018-10-20 PROCEDURE — 74011250636 HC RX REV CODE- 250/636: Performed by: INTERNAL MEDICINE

## 2018-10-20 PROCEDURE — 94760 N-INVAS EAR/PLS OXIMETRY 1: CPT

## 2018-10-20 PROCEDURE — 74011250637 HC RX REV CODE- 250/637: Performed by: INTERNAL MEDICINE

## 2018-10-20 RX ORDER — NITROFURANTOIN 25; 75 MG/1; MG/1
100 CAPSULE ORAL EVERY 12 HOURS
Qty: 6 CAP | Refills: 0 | Status: SHIPPED | OUTPATIENT
Start: 2018-10-20

## 2018-10-20 RX ADMIN — FLUOXETINE HYDROCHLORIDE 20 MG: 20 CAPSULE ORAL at 09:01

## 2018-10-20 RX ADMIN — BUSPIRONE HYDROCHLORIDE 5 MG: 10 TABLET ORAL at 09:01

## 2018-10-20 RX ADMIN — ENOXAPARIN SODIUM 30 MG: 30 INJECTION, SOLUTION INTRAVENOUS; SUBCUTANEOUS at 09:01

## 2018-10-20 RX ADMIN — NITROFURANTOIN MONOHYDRATE/MACROCRYSTALLINE 100 MG: 25; 75 CAPSULE ORAL at 09:00

## 2018-10-20 RX ADMIN — CHOLECALCIFEROL TAB 10 MCG (400 UNIT) 400 UNITS: 10 TAB at 09:00

## 2018-10-20 NOTE — PROGRESS NOTES
Problem: Falls - Risk of 
Goal: *Absence of Falls Document Floyd Kappa Fall Risk and appropriate interventions in the flowsheet. Outcome: Progressing Towards Goal 
Fall Risk Interventions: 
Mobility Interventions: Bed/chair exit alarm Mentation Interventions: Adequate sleep, hydration, pain control, Bed/chair exit alarm, Door open when patient unattended, Family/sitter at bedside, More frequent rounding, Room close to nurse's station Medication Interventions: Bed/chair exit alarm Elimination Interventions: Bed/chair exit alarm, Call light in reach, Toileting schedule/hourly rounds History of Falls Interventions: Consult care management for discharge planning Problem: Pressure Injury - Risk of 
Goal: *Prevention of pressure injury Document Kobi Scale and appropriate interventions in the flowsheet. Outcome: Progressing Towards Goal 
Pressure Injury Interventions: 
Sensory Interventions: Assess changes in LOC Moisture Interventions: Absorbent underpads, Check for incontinence Q2 hours and as needed, Minimize layers Activity Interventions: Pressure redistribution bed/mattress(bed type) Mobility Interventions: Pressure redistribution bed/mattress (bed type) Nutrition Interventions: Document food/fluid/supplement intake Friction and Shear Interventions: Minimize layers, Lift sheet

## 2018-10-20 NOTE — PROGRESS NOTES
Attempted to page and message Dr. Becky Albrecht regarding patients LLE redness and pitting edema. Unable to reach Doctor.

## 2018-10-20 NOTE — PROGRESS NOTES
CM reviewed medical record, followed up re: transitional care plans. CM confirmed Patient scheduled for d/c today w/ transfer admission to [facility] via [facility] transport. Nursing to call report to . CM remains available to assist w/ any additional d/c needs as indicated. Becky Torres, LCSW Peggy Koehler

## 2018-10-20 NOTE — PROGRESS NOTES
Bedside and Verbal shift change report given to Angélica (oncoming nurse) by Pily Tran (offgoing nurse). Report included the following information SBAR, Kardex, Intake/Output and MAR.

## 2018-10-20 NOTE — PROGRESS NOTES
[phone] - received report from [facility] and took over care for pt.  
 
1900 - Bedside shift change report given to Gus Guy RN (oncoming nurse) by Nicanor Salinas RN (offgoing nurse). Report included the following information SBAR, Kardex, Intake/Output, MAR, Accordion and Recent Results.

## 2018-10-20 NOTE — PROGRESS NOTES
09:35 - Report called to [address] regarding [employer]. No questions by John Padilla RN who took report, callback number provided if questions arise after transfer. 11:18 - This nurse called [facility] to ask about ETA for transport. New arrival time of noon given.

## 2018-12-01 NOTE — DISCHARGE SUMMARY
Hospitalist Discharge Summary     Patient ID:  Sukhdev Carney  [id number]  89 y.o.  1/25/1931    PCP on record: Cheri Gill MD    Admit date: 10/16/2018  Discharge date and time: 12/1/2018      DISCHARGE DIAGNOSIS:    UTI  Dementia        CONSULTATIONS:  None    Excerpted HPI from H&P of Tersea Arellano MD:  Jose Fregoso is a 80 y.o.  female with a history of dementia, thyroid disease and anxiety who presents to the ER via EMS because of AMS. Patient lives with her brother and has 24/7 caregivers. She typically is alert, talkative and ambulates with a walker. Lately she has been appearing sleepy, less verbal, appearing weaker with poor appetite. She reportedly fell 2-3 weeks ago and may have hit her head. Yesterday, she experienced left foot pain and swelling which worsened to the point that she could not bear weight today. ER evaluation remarkable for UTI and dehydration. We were asked to admit for work up and evaluation of the above problems.            ______________________________________________________________________  DISCHARGE SUMMARY/HOSPITAL COURSE:  for full details see H&P, daily progress notes, labs, consult notes. Acute encephalopathy  Generalized weakness, s/p falls  suspect underlying dementia. recent fall but exam nonfocal and CT head nothing acute.    AMS/ weakness likely metabolic and multifactorial from UTI, dehydration. UTI  culture is back with the following report   AEROCOCCUS URINAE A. URINAE HAS BEEN DESCRIBED AS SUSCEPTIBLE TO PENICILLIN, AMOXICILLIN, PIPERACILLIN, CEFIPIME, RIFAMPIN AND NITROFURANTOIN, BUT RESISTANT TO SULFONAMIDES. cont Augmentin . Dehydration  Continue the IVF hydration. L foot cellulitis  Pain and swelling better.     Cont augmentin  Depression / Anxiety  continue prozac and buspar    _______________________________________________________________________  Patient seen and examined by me on discharge day.   Pertinent Findings:  Gen:    Not in distress  Chest: Clear lungs  CVS:   Regular rhythm. No edema  Abd:  Soft, not distended, not tender  Neuro:  Alert, not oriented  _______________________________________________________________________  DISCHARGE MEDICATIONS:   Discharge Medication List as of 10/20/2018 10:47 AM      START taking these medications    Details   nitrofurantoin, macrocrystal-monohydrate, (MACROBID) 100 mg capsule Take 1 Cap by mouth every twelve (12) hours. , Normal, Disp-6 Cap, R-0         CONTINUE these medications which have NOT CHANGED    Details   FLUoxetine (PROZAC) 20 mg capsule Take 20 mg by mouth daily. Indications: 1/2 tab, Historical Med      busPIRone (BUSPAR) 5 mg tablet Take 5 mg by mouth three (3) times daily (with meals). , Historical Med      CHOLECALCIFEROL, VITAMIN D3, (VITAMIN D3 PO) Take  by mouth daily. , Historical Med      VITAMIN E ACETATE (VITAMIN E PO) Take  by mouth daily. , Historical Med             My Recommended Diet, Activity, Wound Care, and follow-up labs are listed in the patient's Discharge Insturctions which I have personally completed and reviewed.     ______________________________________________________________________    Risk of deterioration: Moderate    Condition at Discharge:  Stable  ______________________________________________________________________    Disposition  SNF/LTC  ______________________________________________________________________    Care Plan discussed with:   Patient, Family, RN, Care Manager, Consultant    ______________________________________________________________________    Code Status: Full Code  ______________________________________________________________________      Follow up with:   PCP : Chauncey Quinn MD  Follow-up Information     Follow up With Specialties Details Why Contact Info    Chauncey Quinn, [address] [address] [address] [place] [address] Ln Mell Chimera) [address] [facility] [facility] [address] [place] [address]  [phone]                Total time in minutes spent coordinating this discharge (includes going over instructions, follow-up, prescriptions, and preparing report for sign off to her PCP) :  36 minutes    Signed:  Samuel Thakur MD   .